# Patient Record
Sex: FEMALE | Race: OTHER | HISPANIC OR LATINO | Employment: FULL TIME | URBAN - METROPOLITAN AREA
[De-identification: names, ages, dates, MRNs, and addresses within clinical notes are randomized per-mention and may not be internally consistent; named-entity substitution may affect disease eponyms.]

---

## 2024-11-15 ENCOUNTER — HOSPITAL ENCOUNTER (EMERGENCY)
Facility: HOSPITAL | Age: 29
Discharge: HOME | End: 2024-11-15
Payer: COMMERCIAL

## 2024-11-15 VITALS
RESPIRATION RATE: 16 BRPM | WEIGHT: 293 LBS | TEMPERATURE: 99.1 F | DIASTOLIC BLOOD PRESSURE: 58 MMHG | SYSTOLIC BLOOD PRESSURE: 117 MMHG | HEART RATE: 117 BPM | OXYGEN SATURATION: 94 %

## 2024-11-15 DIAGNOSIS — H10.9 CONJUNCTIVITIS: ICD-10-CM

## 2024-11-15 DIAGNOSIS — B34.9 VIRAL SYNDROME: Primary | ICD-10-CM

## 2024-11-15 LAB
ALBUMIN SERPL BCG-MCNC: 3.9 G/DL (ref 3.5–5)
ALP SERPL-CCNC: 59 U/L (ref 34–104)
ALT SERPL W P-5'-P-CCNC: 17 U/L (ref 7–52)
ANION GAP SERPL CALCULATED.3IONS-SCNC: 8 MMOL/L (ref 4–13)
AST SERPL W P-5'-P-CCNC: 17 U/L (ref 13–39)
BASOPHILS # BLD AUTO: 0.03 THOUSANDS/ÂΜL (ref 0–0.1)
BASOPHILS NFR BLD AUTO: 0 % (ref 0–1)
BILIRUB SERPL-MCNC: 0.43 MG/DL (ref 0.2–1)
BUN SERPL-MCNC: 8 MG/DL (ref 5–25)
CALCIUM SERPL-MCNC: 9.1 MG/DL (ref 8.4–10.2)
CHLORIDE SERPL-SCNC: 101 MMOL/L (ref 96–108)
CO2 SERPL-SCNC: 24 MMOL/L (ref 21–32)
CREAT SERPL-MCNC: 0.76 MG/DL (ref 0.6–1.3)
EOSINOPHIL # BLD AUTO: 0.04 THOUSAND/ÂΜL (ref 0–0.61)
EOSINOPHIL NFR BLD AUTO: 0 % (ref 0–6)
ERYTHROCYTE [DISTWIDTH] IN BLOOD BY AUTOMATED COUNT: 14.6 % (ref 11.6–15.1)
FLUAV AG UPPER RESP QL IA.RAPID: NEGATIVE
FLUBV AG UPPER RESP QL IA.RAPID: NEGATIVE
GFR SERPL CREATININE-BSD FRML MDRD: 106 ML/MIN/1.73SQ M
GLUCOSE SERPL-MCNC: 137 MG/DL (ref 65–140)
HCT VFR BLD AUTO: 36.5 % (ref 34.8–46.1)
HGB BLD-MCNC: 11.7 G/DL (ref 11.5–15.4)
IMM GRANULOCYTES # BLD AUTO: 0.03 THOUSAND/UL (ref 0–0.2)
IMM GRANULOCYTES NFR BLD AUTO: 0 % (ref 0–2)
LYMPHOCYTES # BLD AUTO: 0.82 THOUSANDS/ÂΜL (ref 0.6–4.47)
LYMPHOCYTES NFR BLD AUTO: 8 % (ref 14–44)
MCH RBC QN AUTO: 27.1 PG (ref 26.8–34.3)
MCHC RBC AUTO-ENTMCNC: 32.1 G/DL (ref 31.4–37.4)
MCV RBC AUTO: 85 FL (ref 82–98)
MONOCYTES # BLD AUTO: 1 THOUSAND/ÂΜL (ref 0.17–1.22)
MONOCYTES NFR BLD AUTO: 10 % (ref 4–12)
NEUTROPHILS # BLD AUTO: 8.01 THOUSANDS/ÂΜL (ref 1.85–7.62)
NEUTS SEG NFR BLD AUTO: 82 % (ref 43–75)
NRBC BLD AUTO-RTO: 0 /100 WBCS
PLATELET # BLD AUTO: 178 THOUSANDS/UL (ref 149–390)
PMV BLD AUTO: 12 FL (ref 8.9–12.7)
POTASSIUM SERPL-SCNC: 3.7 MMOL/L (ref 3.5–5.3)
PROT SERPL-MCNC: 8 G/DL (ref 6.4–8.4)
RBC # BLD AUTO: 4.31 MILLION/UL (ref 3.81–5.12)
SARS-COV+SARS-COV-2 AG RESP QL IA.RAPID: NEGATIVE
SODIUM SERPL-SCNC: 133 MMOL/L (ref 135–147)
TSH SERPL DL<=0.05 MIU/L-ACNC: 2 UIU/ML (ref 0.45–4.5)
WBC # BLD AUTO: 9.93 THOUSAND/UL (ref 4.31–10.16)

## 2024-11-15 PROCEDURE — 87804 INFLUENZA ASSAY W/OPTIC: CPT

## 2024-11-15 PROCEDURE — 36415 COLL VENOUS BLD VENIPUNCTURE: CPT

## 2024-11-15 PROCEDURE — 99284 EMERGENCY DEPT VISIT MOD MDM: CPT

## 2024-11-15 PROCEDURE — 87811 SARS-COV-2 COVID19 W/OPTIC: CPT

## 2024-11-15 PROCEDURE — 96361 HYDRATE IV INFUSION ADD-ON: CPT

## 2024-11-15 PROCEDURE — 85025 COMPLETE CBC W/AUTO DIFF WBC: CPT

## 2024-11-15 PROCEDURE — 80053 COMPREHEN METABOLIC PANEL: CPT

## 2024-11-15 PROCEDURE — 99283 EMERGENCY DEPT VISIT LOW MDM: CPT

## 2024-11-15 PROCEDURE — 84443 ASSAY THYROID STIM HORMONE: CPT

## 2024-11-15 PROCEDURE — 96374 THER/PROPH/DIAG INJ IV PUSH: CPT

## 2024-11-15 RX ORDER — ERYTHROMYCIN 5 MG/G
0.5 OINTMENT OPHTHALMIC ONCE
Status: COMPLETED | OUTPATIENT
Start: 2024-11-15 | End: 2024-11-15

## 2024-11-15 RX ORDER — KETOROLAC TROMETHAMINE 30 MG/ML
15 INJECTION, SOLUTION INTRAMUSCULAR; INTRAVENOUS ONCE
Status: COMPLETED | OUTPATIENT
Start: 2024-11-15 | End: 2024-11-15

## 2024-11-15 RX ORDER — ERYTHROMYCIN 5 MG/G
OINTMENT OPHTHALMIC EVERY 6 HOURS SCHEDULED
Qty: 1 G | Refills: 0 | Status: SHIPPED | OUTPATIENT
Start: 2024-11-15 | End: 2024-11-20

## 2024-11-15 RX ADMIN — ERYTHROMYCIN 0.5 INCH: 5 OINTMENT OPHTHALMIC at 12:00

## 2024-11-15 RX ADMIN — KETOROLAC TROMETHAMINE 15 MG: 30 INJECTION, SOLUTION INTRAMUSCULAR at 12:00

## 2024-11-15 RX ADMIN — SODIUM CHLORIDE 1000 ML: 0.9 INJECTION, SOLUTION INTRAVENOUS at 11:57

## 2024-11-15 NOTE — Clinical Note
Macy Ramos was seen and treated in our emergency department on 11/15/2024.                Diagnosis:     Macy  may return to work on return date.    She may return on this date: 11/20/2024         If you have any questions or concerns, please don't hesitate to call.      NATALIE Reynolds    ______________________________           _______________          _______________  Hospital Representative                              Date                                Time

## 2024-11-15 NOTE — DISCHARGE INSTRUCTIONS
Strict hand hygiene   Take Augmentin- if URI symptoms worsen  Erythromycin- to right eye   Take your synthroid as prescribed  Follow up with primary care provider and/or eye doctor

## 2024-11-15 NOTE — ED PROVIDER NOTES
Time reflects when diagnosis was documented in both MDM as applicable and the Disposition within this note       Time User Action Codes Description Comment    11/15/2024  1:04 PM Radha Joaquin Add [B34.9] Viral syndrome     11/15/2024  1:32 PM Radha Joaquin Add [H10.9] Conjunctivitis           ED Disposition       ED Disposition   Discharge    Condition   Stable    Date/Time   Fri Nov 15, 2024  1:04 PM    Comment   Macy Herreraierrez discharge to home/self care.                   Assessment & Plan       Medical Decision Making  This patient presents with symptoms suspicious for likely viral upper respiratory infection.  Based on history and physical doubt sinusitis.  COVID/flu/RSV was sent and resulted negative.  Do not suspect underlying cardio or pulmonary process.  I considered, I think but unlikely dangerous causes of patient's symptoms to include ACS, CHF, COPD/asthma exacerbation, pneumonia or pneumothorax.  Patient is nontoxic-appearing and not in need of emergent intervention at this time.  Patient told to self isolate at home until symptoms subside.  The patient is otherwise well-appearing without evidence of retained foreign body, corneal ulcer, globe rupture or superimposed infection.  Prescribed antibiotics and advised patient to have follow-up close with ophthalmology and to avoid wearing contacts.  Follow up with primary care provider.  Return to ER symptoms worsens or questions or concerns arise at home.      Amount and/or Complexity of Data Reviewed  Labs: ordered.    Risk  Prescription drug management.             Medications   sodium chloride 0.9 % bolus 1,000 mL (0 mL Intravenous Stopped 11/15/24 1338)   erythromycin (ILOTYCIN) 0.5 % ophthalmic ointment 0.5 inch (0.5 inches Right Eye Given 11/15/24 1200)   ketorolac (TORADOL) injection 15 mg (15 mg Intravenous Given 11/15/24 1200)       ED Risk Strat Scores                                               History of Present Illness       Chief Complaint    Patient presents with    Eye Redness    Flu Symptoms     Pt reports redness and edema of right eye for the past 2 days. Clear drainage. Sensitivity to light. Flu symptoms that began this am. +body aches/chills/subjective fevers.        Past Medical History:   Diagnosis Date    Disease of thyroid gland       History reviewed. No pertinent surgical history.   History reviewed. No pertinent family history.   Social History     Tobacco Use    Smoking status: Never    Smokeless tobacco: Never   Substance Use Topics    Alcohol use: Not Currently    Drug use: Never      E-Cigarette/Vaping      E-Cigarette/Vaping Substances      I have reviewed and agree with the history as documented.     29-year-old female patient presents the ER for evaluation of multiple complaints.  Patient stated that she has been having 2 days of right eye drainage, injection to the conjunctiva.  Patient states that the drainage is clear.  She reports the right eye is itching.  She reports that she woke up this morning with URI symptoms.  She complains of congestion, subjective fevers, rhinorrhea, sore throat.  No noted documented fever.  No noted chest pain, shortness of breath, dizziness or syncope.  Patient reports that she has not been taking her thyroid medication as prescribed.  She reports no recent blood work.  She denies nausea, vomiting or diarrhea.        Review of Systems   Constitutional:  Positive for activity change and chills. Negative for fever.   HENT:  Positive for congestion, rhinorrhea, sinus pressure and sore throat. Negative for ear pain.    Eyes:  Positive for discharge and redness. Negative for pain and visual disturbance.   Respiratory:  Negative for cough and shortness of breath.    Cardiovascular:  Negative for chest pain and palpitations.   Gastrointestinal:  Negative for abdominal pain and vomiting.   Genitourinary:  Negative for dysuria and hematuria.   Musculoskeletal:  Negative for arthralgias and back pain.   Skin:   Negative for color change and rash.   Neurological:  Positive for headaches. Negative for seizures and syncope.   All other systems reviewed and are negative.          Objective       ED Triage Vitals   Temperature Pulse Blood Pressure Respirations SpO2 Patient Position - Orthostatic VS   11/15/24 1114 11/15/24 1114 11/15/24 1114 11/15/24 1114 11/15/24 1114 11/15/24 1114   100 °F (37.8 °C) (!) 144 140/62 18 96 % Sitting      Temp Source Heart Rate Source BP Location FiO2 (%) Pain Score    11/15/24 1114 11/15/24 1114 11/15/24 1114 -- 11/15/24 1200    Oral Monitor Left arm  7      Vitals      Date and Time Temp Pulse SpO2 Resp BP Pain Score FACES Pain Rating User   11/15/24 1315 99.1 °F (37.3 °C) 117 94 % 16 117/58 -- -- JT   11/15/24 1245 -- 119 97 % 17 135/74 -- -- AC   11/15/24 1200 -- -- -- -- -- 7 -- AC   11/15/24 1114 100 °F (37.8 °C) 144 96 % 18 140/62 -- -- LA            Physical Exam  Vitals and nursing note reviewed.   Constitutional:       General: She is not in acute distress.     Appearance: She is well-developed.   HENT:      Head: Normocephalic and atraumatic.      Right Ear: A middle ear effusion is present.      Left Ear: A middle ear effusion is present.      Nose: Congestion present.      Mouth/Throat:      Mouth: Mucous membranes are moist.   Eyes:      General:         Right eye: Discharge present.      Conjunctiva/sclera:      Right eye: Right conjunctiva is injected.   Cardiovascular:      Rate and Rhythm: Normal rate and regular rhythm.      Heart sounds: No murmur heard.  Pulmonary:      Effort: Pulmonary effort is normal. No respiratory distress.      Breath sounds: Normal breath sounds.   Abdominal:      Palpations: Abdomen is soft.      Tenderness: There is no abdominal tenderness.   Musculoskeletal:         General: No swelling.      Cervical back: Neck supple.   Skin:     General: Skin is warm and dry.      Capillary Refill: Capillary refill takes less than 2 seconds.   Neurological:       Mental Status: She is alert and oriented to person, place, and time.   Psychiatric:         Mood and Affect: Mood normal.         Behavior: Behavior normal.         Results Reviewed       Procedure Component Value Units Date/Time    FLU/COVID Rapid Antigen (30 min. TAT) - Preferred screening test in ED [[id number]]  (Normal) Collected: 11/15/24 1200    Lab Status: Final result Specimen: Nares from Nose Updated: 11/15/24 1237     SARS COV Rapid Antigen Negative     Influenza A Rapid Antigen Negative     Influenza B Rapid Antigen Negative    Narrative:      This test has been performed using the [employer] Radha 2 FLU+SARS Antigen test under the Emergency Use Authorization (EUA). This test has been validated by the  and verified by the performing laboratory. The Radha uses lateral flow immunofluorescent sandwich assay to detect SARS-COV, Influenza A and Influenza B Antigen.     The [employer] Radha 2 SARS Antigen test does not differentiate between SARS-CoV and SARS-CoV-2.     Negative results are presumptive and may be confirmed with a molecular assay, if necessary, for patient management. Negative results do not rule out SARS-CoV-2 or influenza infection and should not be used as the sole basis for treatment or patient management decisions. A negative test result may occur if the level of antigen in a sample is below the limit of detection of this test.     Positive results are indicative of the presence of viral antigens, but do not rule out bacterial infection or co-infection with other viruses.     All test results should be used as an adjunct to clinical observations and other information available to the provider.    FOR PEDIATRIC PATIENTS - copy/paste COVID Guidelines URL to browser: [email or web address]    TSH, 3rd generation with Free T4 reflex [[id number]]  (Normal) Collected: 11/15/24 1156    Lab Status: Final result Specimen: Blood from Arm, Right  Updated: 11/15/24 1233     TSH 3RD GENERATON 2.002 uIU/mL     Comprehensive metabolic panel [[id number]]  (Abnormal) Collected: 11/15/24 1156    Lab Status: Final result Specimen: Blood from Arm, Right Updated: 11/15/24 1217     Sodium 133 mmol/L      Potassium 3.7 mmol/L      Chloride 101 mmol/L      CO2 24 mmol/L      ANION GAP 8 mmol/L      BUN 8 mg/dL      Creatinine 0.76 mg/dL      Glucose 137 mg/dL      Calcium 9.1 mg/dL      AST 17 U/L      ALT 17 U/L      Alkaline Phosphatase 59 U/L      Total Protein 8.0 g/dL      Albumin 3.9 g/dL      Total Bilirubin 0.43 mg/dL      eGFR 106 ml/min/1.73sq m     Narrative:      [employer] guidelines for Chronic Kidney Disease (CKD):     Stage 1 with normal or high GFR (GFR > 90 mL/min/1.73 square meters)    Stage 2 Mild CKD (GFR = 60-89 mL/min/1.73 square meters)    Stage 3A Moderate CKD (GFR = 45-59 mL/min/1.73 square meters)    Stage 3B Moderate CKD (GFR = 30-44 mL/min/1.73 square meters)    Stage 4 Severe CKD (GFR = 15-29 mL/min/1.73 square meters)    Stage 5 End Stage CKD (GFR <15 mL/min/1.73 square meters)  Note: GFR calculation is accurate only with a steady state creatinine    CBC and differential [[id number]]  (Abnormal) Collected: 11/15/24 1156    Lab Status: Final result Specimen: Blood from Arm, Right Updated: 11/15/24 1203     WBC 9.93 Thousand/uL      RBC 4.31 Million/uL      Hemoglobin 11.7 g/dL      Hematocrit 36.5 %      MCV 85 fL      MCH 27.1 pg      MCHC 32.1 g/dL      RDW 14.6 %      MPV 12.0 fL      Platelets 178 Thousands/uL      nRBC 0 /100 WBCs      Segmented % 82 %      Immature Grans % 0 %      Lymphocytes % 8 %      Monocytes % 10 %      Eosinophils Relative 0 %      Basophils Relative 0 %      Absolute Neutrophils 8.01 Thousands/µL      Absolute Immature Grans 0.03 Thousand/uL      Absolute Lymphocytes 0.82 Thousands/µL      Absolute Monocytes 1.00 Thousand/µL      Eosinophils Absolute 0.04 Thousand/µL      Basophils  Absolute 0.03 Thousands/µL             No orders to display       Procedures    ED Medication and Procedure Management   None     Discharge Medication List as of 11/15/2024  1:35 PM        START taking these medications    Details   amoxicillin-clavulanate (AUGMENTIN) 875-125 mg per tablet Take 1 tablet by mouth every 12 (twelve) hours for 7 days, Starting Fri 11/15/2024, Until Fri 11/22/2024, Normal      erythromycin (ILOTYCIN) ophthalmic ointment Administer to the right eye every 6 (six) hours for 5 days Place a 1/2 inch ribbon of ointment into the lower eyelid., Starting Fri 11/15/2024, Until Wed 11/20/2024, Normal           No discharge procedures on file.  ED SEPSIS DOCUMENTATION   Time reflects when diagnosis was documented in both MDM as applicable and the Disposition within this note       Time User Action Codes Description Comment    11/15/2024  1:04 PM Radha Joaquin [B34.9] Viral syndrome     11/15/2024  1:32 PM Radha Joaquin [H10.9] Conjunctivitis                  NATALIE Reynolds  11/15/24 1500